# Patient Record
Sex: FEMALE | Race: WHITE | Employment: UNEMPLOYED | URBAN - METROPOLITAN AREA
[De-identification: names, ages, dates, MRNs, and addresses within clinical notes are randomized per-mention and may not be internally consistent; named-entity substitution may affect disease eponyms.]

---

## 2019-02-16 DIAGNOSIS — Z98.890 S/P CEREBRAL ANEURYSM REPAIR: ICD-10-CM

## 2019-02-16 DIAGNOSIS — G43.109 MIGRAINE WITH AURA AND WITHOUT STATUS MIGRAINOSUS, NOT INTRACTABLE: ICD-10-CM

## 2019-02-16 DIAGNOSIS — Z86.79 S/P CEREBRAL ANEURYSM REPAIR: ICD-10-CM

## 2019-02-16 DIAGNOSIS — I67.1 CEREBRAL ANEURYSM WITHOUT RUPTURE: ICD-10-CM

## 2019-02-16 RX ORDER — RIZATRIPTAN BENZOATE 10 MG/1
10 TABLET ORAL
Qty: 12 TAB | Refills: 11 | Status: SHIPPED | OUTPATIENT
Start: 2019-02-16 | End: 2020-03-11 | Stop reason: SDUPTHER

## 2020-03-11 DIAGNOSIS — Z98.890 S/P CEREBRAL ANEURYSM REPAIR: ICD-10-CM

## 2020-03-11 DIAGNOSIS — G43.109 MIGRAINE WITH AURA AND WITHOUT STATUS MIGRAINOSUS, NOT INTRACTABLE: ICD-10-CM

## 2020-03-11 DIAGNOSIS — Z86.79 S/P CEREBRAL ANEURYSM REPAIR: ICD-10-CM

## 2020-03-11 DIAGNOSIS — I67.1 CEREBRAL ANEURYSM WITHOUT RUPTURE: ICD-10-CM

## 2020-03-11 RX ORDER — RIZATRIPTAN BENZOATE 10 MG/1
10 TABLET ORAL
Qty: 12 TAB | Refills: 11 | Status: SHIPPED | OUTPATIENT
Start: 2020-03-11 | End: 2021-03-25 | Stop reason: SDUPTHER

## 2021-03-25 DIAGNOSIS — Z98.890 S/P CEREBRAL ANEURYSM REPAIR: ICD-10-CM

## 2021-03-25 DIAGNOSIS — I67.1 CEREBRAL ANEURYSM WITHOUT RUPTURE: ICD-10-CM

## 2021-03-25 DIAGNOSIS — G43.109 MIGRAINE WITH AURA AND WITHOUT STATUS MIGRAINOSUS, NOT INTRACTABLE: ICD-10-CM

## 2021-03-25 DIAGNOSIS — Z86.79 S/P CEREBRAL ANEURYSM REPAIR: ICD-10-CM

## 2021-03-25 RX ORDER — RIZATRIPTAN BENZOATE 10 MG/1
10 TABLET ORAL
Qty: 12 TAB | Refills: 11 | Status: SHIPPED | OUTPATIENT
Start: 2021-03-25 | End: 2022-03-23 | Stop reason: SDUPTHER

## 2021-04-08 ENCOUNTER — OFFICE VISIT (OUTPATIENT)
Dept: NEUROLOGY | Age: 46
End: 2021-04-08
Payer: COMMERCIAL

## 2021-04-08 VITALS
OXYGEN SATURATION: 100 % | HEIGHT: 64 IN | WEIGHT: 107.8 LBS | DIASTOLIC BLOOD PRESSURE: 82 MMHG | TEMPERATURE: 97.8 F | SYSTOLIC BLOOD PRESSURE: 112 MMHG | RESPIRATION RATE: 16 BRPM | BODY MASS INDEX: 18.4 KG/M2 | HEART RATE: 65 BPM

## 2021-04-08 DIAGNOSIS — G43.109 MIGRAINE WITH AURA AND WITHOUT STATUS MIGRAINOSUS, NOT INTRACTABLE: Primary | ICD-10-CM

## 2021-04-08 DIAGNOSIS — Z86.79: ICD-10-CM

## 2021-04-08 DIAGNOSIS — I67.1 CEREBRAL ANEURYSM WITHOUT RUPTURE: ICD-10-CM

## 2021-04-08 PROCEDURE — 99244 OFF/OP CNSLTJ NEW/EST MOD 40: CPT | Performed by: PSYCHIATRY & NEUROLOGY

## 2021-04-08 NOTE — PROGRESS NOTES
Consult    Subjective:     Mackenzie Islas is a 50 y.o. Right-handed  female seen for new patient evaluation since she has not been seen in over 5 years at the request of Dr. Jair Hagen for evaluation of headaches and evaluation of her cerebral aneurysms. The patient had a history of a ruptured right-sided middle cerebral artery aneurysm that was treated by surgical clipping by Dr. Mic Villasenor in 2004. 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 The patient has been having increasing headaches, associated with nausea but rarely vomiting, and sometimes become incapacitating. She is concerned about her aneurysm, possibly expanding or having sentinel bleeds. 7 years ago she had a spinal tap that was negative for any bleeding. She occasionally gets some visual aura, but has not had any clear focal weakness or sensory loss. She takes Maxalt for headaches which usually helps relieve them, and she gets the headaches about 9 or 10 times a month. She has had no unusual fever, trauma, or other causes like increased stress or tension to account for the headaches. 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 She was seen by interventional neuroradiology over 5 years ago, they recommended repeat study in 5 to 10 years. She is seen for that today.     Past Medical History:   Diagnosis Date    Aneurysm ([address]     Brain aneurysm     Essential hypertension     Headache     Hypertension     Migraine       Past Surgical History:   Procedure Laterality Date    HX GYN      HX INTRACRANIAL ANEURYSM REPAIR  2004     Family History   Problem Relation Age of Onset    Dementia Paternal Grandmother     Cancer Mother         breast    Other Mother         CEA    Cancer Maternal Grandmother     Headache Maternal Grandmother     Stroke Maternal Grandmother     Other Father         GSW      Social History     Tobacco Use    Smoking status: Former Smoker    Smokeless tobacco: Never Used   Substance Use Topics    Alcohol use: Yes     Alcohol/week: 7.0 standard drinks     Types: 7 Glasses of wine per week     Comment: socially         Current Outpatient Medications:     rizatriptan (MAXALT) 10 mg tablet, Take 1 Tab by mouth every eight (8) hours as needed for Migraine. , Disp: 12 Tab, Rfl: 11    citalopram (CELEXA) 40 mg tablet, Take 40 mg by mouth daily. , Disp: , Rfl:     lisinopril (PRINIVIL, ZESTRIL) 2.5 mg tablet, Take 2.5 mg by mouth daily. , Disp: , Rfl:         Allergies   Allergen Reactions    Ibuprofen Hives    Ibuprofen Hives        Review of Systems:  A comprehensive review of systems was negative except for: Neurological: positive for headaches, coordination problems and gait problems   Vitals:    04/08/21 1241   BP: 112/82   Pulse: 65   Resp: 16   Temp: 97.8 °F (36.6 °C)   TempSrc: Temporal   SpO2: 100%   Weight: 107 lb 12.8 oz (48.9 kg)   Height: 5' 3.5\" (1.613 m)     Objective:     I      NEUROLOGICAL EXAM:    Appearance: The patient is well developed, well nourished, provides a coherent history and is in no acute distress. Mental Status: Oriented to time, place and person, and the president, cognitive function is normal and speech is fluent and no aphasia or dysarthria. Mood and affect appropriate. Cranial Nerves:   Intact visual fields. Fundi are benign. ROBI, EOM's full, no nystagmus, no ptosis. Facial sensation is normal. Corneal reflexes are not tested. Facial movement is asymmetric with slightly widened palpebral fissures on the right. Hearing is normal bilaterally. Palate is midline with normal sternocleidomastoid and trapezius muscles are normal. Tongue is midline.   Neck without meningismus or bruits   Motor:  5/5 strength in upper and lower proximal and distal muscles. Normal bulk and tone. No fasciculations. Reflexes:   Deep tendon reflexes 2+/4 and symmetrical.  No babinski or clonus present   Sensory:   Normal to touch, pinprick and vibration. DSS is intact   Gait:  Normal gait. Tremor:   No tremor noted. Cerebellar:  No cerebellar signs present. Neurovascular:  Normal heart sounds and regular rhythm, peripheral pulses intact, and no carotid bruits. Assessment:       ICD-10-CM ICD-9-CM    1. Migraine with aura and without status migrainosus, not intractable  H00.199 496.15 METABOLIC PANEL, COMPREHENSIVE      CBC WITH AUTOMATED DIFF      CTA HEAD NECK W CONT      METABOLIC PANEL, COMPREHENSIVE      CBC WITH AUTOMATED DIFF   2. H/O spontaneous subarachnoid intracranial hemorrhage due to cerebral aneurysm  G27.60 M34.30 METABOLIC PANEL, COMPREHENSIVE      CBC WITH AUTOMATED DIFF      CTA HEAD NECK W CONT      METABOLIC PANEL, COMPREHENSIVE      CBC WITH AUTOMATED DIFF   3. Cerebral aneurysm without rupture  H32.5 498.8 METABOLIC PANEL, COMPREHENSIVE      CBC WITH AUTOMATED DIFF      CTA HEAD NECK W CONT      METABOLIC PANEL, COMPREHENSIVE      CBC WITH AUTOMATED DIFF         Plan:     Patient with known cerebral aneurysm, and previous ruptured aneurysm with increasing headaches and concern for enlarging aneurysm or small bleeds  We will ask her to get a CTA of the head to check the stability of the left middle cerebral artery aneurysm, and see if there is any basilar artery aneurysm or vertebral artery aneurysm present, that she had in 2004  We did routine labs to make sure her kidney function was stable.   We will continue the patient on Maxalt for her headache, and she is to avoid aspirin compounds  She will call if any sudden change in her headaches, and call the results of her tests  Followup to be arranged after this workup or in 1 years time or earlier as need be.  Very worrisome problem that needs to be rechecked and followed closely.   60 minutes spent with patient reviewing her records, going over all of her past history, discussing her CTA needed, and she will go to open unit because of her claustrophobia, and we will see her again in 1 years time assuming the CTA is stable otherwise she may need referral back to neuro interventional radiology to consider stenting or other treatment of her aneurysms since she has multiple and she is a very high risk for recurrent hemorrhage    Signed By: Breezy Serra MD     April 8, 2021

## 2021-04-08 NOTE — PROGRESS NOTES
Chief Complaint   Patient presents with    New Patient     aneurisims in the brain and make sure the clamp is still intact.

## 2021-04-09 LAB
ALBUMIN SERPL-MCNC: 4.5 G/DL (ref 3.8–4.8)
ALBUMIN/GLOB SERPL: 2 {RATIO} (ref 1.2–2.2)
ALP SERPL-CCNC: 50 IU/L (ref 39–117)
ALT SERPL-CCNC: 20 IU/L (ref 0–32)
AST SERPL-CCNC: 22 IU/L (ref 0–40)
BASOPHILS # BLD AUTO: 0 X10E3/UL (ref 0–0.2)
BASOPHILS NFR BLD AUTO: 1 %
BILIRUB SERPL-MCNC: 1.2 MG/DL (ref 0–1.2)
BUN SERPL-MCNC: 10 MG/DL (ref 6–24)
BUN/CREAT SERPL: 14 (ref 9–23)
CALCIUM SERPL-MCNC: 9.5 MG/DL (ref 8.7–10.2)
CHLORIDE SERPL-SCNC: 105 MMOL/L (ref 96–106)
CO2 SERPL-SCNC: 23 MMOL/L (ref 20–29)
CREAT SERPL-MCNC: 0.71 MG/DL (ref 0.57–1)
EOSINOPHIL # BLD AUTO: 0.2 X10E3/UL (ref 0–0.4)
EOSINOPHIL NFR BLD AUTO: 3 %
ERYTHROCYTE [DISTWIDTH] IN BLOOD BY AUTOMATED COUNT: 12.3 % (ref 11.7–15.4)
GLOBULIN SER CALC-MCNC: 2.3 G/DL (ref 1.5–4.5)
GLUCOSE SERPL-MCNC: 84 MG/DL (ref 65–99)
HCT VFR BLD AUTO: 41.6 % (ref 34–46.6)
HGB BLD-MCNC: 14.1 G/DL (ref 11.1–15.9)
IMM GRANULOCYTES # BLD AUTO: 0 X10E3/UL (ref 0–0.1)
IMM GRANULOCYTES NFR BLD AUTO: 0 %
LYMPHOCYTES # BLD AUTO: 2.2 X10E3/UL (ref 0.7–3.1)
LYMPHOCYTES NFR BLD AUTO: 33 %
MCH RBC QN AUTO: 31.1 PG (ref 26.6–33)
MCHC RBC AUTO-ENTMCNC: 33.9 G/DL (ref 31.5–35.7)
MCV RBC AUTO: 92 FL (ref 79–97)
MONOCYTES # BLD AUTO: 0.5 X10E3/UL (ref 0.1–0.9)
MONOCYTES NFR BLD AUTO: 8 %
NEUTROPHILS # BLD AUTO: 3.7 X10E3/UL (ref 1.4–7)
NEUTROPHILS NFR BLD AUTO: 55 %
PLATELET # BLD AUTO: 220 X10E3/UL (ref 150–450)
POTASSIUM SERPL-SCNC: 4.2 MMOL/L (ref 3.5–5.2)
PROT SERPL-MCNC: 6.8 G/DL (ref 6–8.5)
RBC # BLD AUTO: 4.54 X10E6/UL (ref 3.77–5.28)
SODIUM SERPL-SCNC: 143 MMOL/L (ref 134–144)
WBC # BLD AUTO: 6.7 X10E3/UL (ref 3.4–10.8)

## 2021-04-19 ENCOUNTER — HOSPITAL ENCOUNTER (OUTPATIENT)
Dept: CT IMAGING | Age: 46
Discharge: HOME | End: 2021-04-19
Attending: PSYCHIATRY & NEUROLOGY
Payer: COMMERCIAL

## 2021-04-19 ENCOUNTER — DOCUMENTATION ONLY (OUTPATIENT)
Dept: NEUROLOGY | Age: 46
End: 2021-04-19

## 2021-04-19 DIAGNOSIS — I67.1 CEREBRAL ANEURYSM WITHOUT RUPTURE: ICD-10-CM

## 2021-04-19 DIAGNOSIS — Z86.79: ICD-10-CM

## 2021-04-19 DIAGNOSIS — G43.109 MIGRAINE WITH AURA AND WITHOUT STATUS MIGRAINOSUS, NOT INTRACTABLE: ICD-10-CM

## 2021-04-19 PROCEDURE — 70496 CT ANGIOGRAPHY HEAD: CPT

## 2021-04-19 PROCEDURE — 70498 CT ANGIOGRAPHY NECK: CPT

## 2021-04-19 PROCEDURE — 74011000636 HC RX REV CODE- 636: Performed by: RADIOLOGY

## 2021-04-19 RX ADMIN — IOPAMIDOL 100 ML: 755 INJECTION, SOLUTION INTRAVENOUS at 13:21

## 2021-04-19 NOTE — PROGRESS NOTES
I called the patient, told her CTA looks stable, aneurysms all look stable.   We should probably repeat in 2 to 3 years, and lung nodule at 4 mm needs no follow-up according to report but will probably check that at next CTA when we do her head injury 3 years

## 2022-03-19 PROBLEM — Z86.79: Status: ACTIVE | Noted: 2021-04-08

## 2022-03-23 ENCOUNTER — OFFICE VISIT (OUTPATIENT)
Dept: NEUROLOGY | Age: 47
End: 2022-03-23
Payer: COMMERCIAL

## 2022-03-23 VITALS
HEART RATE: 76 BPM | SYSTOLIC BLOOD PRESSURE: 120 MMHG | TEMPERATURE: 97.5 F | OXYGEN SATURATION: 99 % | DIASTOLIC BLOOD PRESSURE: 72 MMHG | RESPIRATION RATE: 18 BRPM | WEIGHT: 115 LBS | BODY MASS INDEX: 20.05 KG/M2

## 2022-03-23 DIAGNOSIS — Z86.79 S/P CEREBRAL ANEURYSM REPAIR: ICD-10-CM

## 2022-03-23 DIAGNOSIS — Z98.890 S/P CEREBRAL ANEURYSM REPAIR: ICD-10-CM

## 2022-03-23 DIAGNOSIS — G43.109 MIGRAINE WITH AURA AND WITHOUT STATUS MIGRAINOSUS, NOT INTRACTABLE: Primary | ICD-10-CM

## 2022-03-23 DIAGNOSIS — I67.1 CEREBRAL ANEURYSM WITHOUT RUPTURE: ICD-10-CM

## 2022-03-23 DIAGNOSIS — Z86.79: ICD-10-CM

## 2022-03-23 PROCEDURE — 99214 OFFICE O/P EST MOD 30 MIN: CPT | Performed by: PSYCHIATRY & NEUROLOGY

## 2022-03-23 RX ORDER — RIZATRIPTAN BENZOATE 10 MG/1
10 TABLET ORAL
Qty: 12 TABLET | Refills: 11 | Status: SHIPPED | OUTPATIENT
Start: 2022-03-23

## 2022-03-23 NOTE — LETTER
3/23/2022    Patient: Hien Morales   YOB: 1975   Date of Visit: 3/23/2022     Paulette Baker PA-C  [address]  [place] [address] [facility] [address]  Via Fax: [phone]    Dear Paulette Baker PA-C,      Thank you for referring Ms. Hien Morales to [facility] for evaluation. My notes for this consultation are attached. Consult    Subjective:     Hien Morales is a 48 y.o. Right-handed  female seen for new patient evaluation since she has not been seen in over 5 years at the request of Dr. Park Love for evaluation of increasing headaches not completely relieved by Maxalt, and having some side effect on Maxalt so we discussed new options like the new CGRP inhibitor Ubrelvy, and she is going to try that so new prescription sent in for her today for that medicine. She was advised of the risk and benefits and side effects, and the fact that it can cause a 1% incidence over placebo for sedation. We will also continue the Maxalt just to have her have a backup in case. Patient is being seen for migraine headaches and evaluation of her cerebral aneurysms. The patient had a history of a ruptured right-sided middle cerebral artery aneurysm that was treated by surgical clipping by Dr. Deep Ruano in 2004. She had a CTA 1 year ago that showed the stable 3 mm M1 segment middle cerebral artery aneurysm and right MCA bifurcation aneurysm that was clipped without residual aneurysmal opacification, and a 2 mm right V3 segment aneurysm, and follow-up was recommended in 2 to 3 years. The patient also had a lung nodule for which no repeat scan was recommended but it was less than 4 mm. We will check that we will recheck her CTA next year. The patient has been having increasing headaches, associated with nausea but rarely vomiting, and sometimes become incapacitating.   8 years ago she had bad headaches and had a spinal tap done to rule out a bleed, that was negative. She occasionally gets some visual aura, but has not had any clear focal weakness or sensory loss. Headaches are commonly unilateral and throbbing in character associated with some nausea and rarely vomiting and has a visual aura. She takes Maxalt for headaches which usually helps relieve them, and she gets the headaches about 9 or 10 times a month. She has had no unusual fever, trauma, or other causes like increased stress or tension to account for the headaches. 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 She was seen by interventional neuroradiology over 5 years ago, they recommended repeat study in 5 to 10 years. Past Medical History:   Diagnosis Date    Aneurysm ([address]     Brain aneurysm     Essential hypertension     Headache     Hypertension     Migraine       Past Surgical History:   Procedure Laterality Date    HX GYN      HX INTRACRANIAL ANEURYSM REPAIR  2004     Family History   Problem Relation Age of Onset    Dementia Paternal Grandmother     Cancer Mother         breast    Other Mother         CEA    Cancer Maternal Grandmother     Headache Maternal Grandmother     Stroke Maternal Grandmother     Other Father         GSW      Social History     Tobacco Use    Smoking status: Former Smoker    Smokeless tobacco: Never Used   Substance Use Topics    Alcohol use: Yes     Alcohol/week: 7.0 standard drinks     Types: 7 Glasses of wine per week     Comment: socially         Current Outpatient Medications:     ubrogepant (Ubrelvy) 50 mg tablet, 1 PO att onset of headache and can repeat in 2 hours if needed, max dose 2 per day, Disp: 16 Tablet, Rfl: 11    rizatriptan (MAXALT) 10 mg tablet, Take 1 Tablet by mouth every eight (8) hours as needed for Migraine. , Disp: 12 Tablet, Rfl: 11    citalopram (CELEXA) 40 mg tablet, Take 40 mg by mouth daily. , Disp: , Rfl:     lisinopril (PRINIVIL, ZESTRIL) 2.5 mg tablet, Take 2.5 mg by mouth daily. , Disp: , Rfl:         Allergies   Allergen Reactions    Ibuprofen Hives    Ibuprofen Hives        Review of Systems:  A comprehensive review of systems was negative except for: Neurological: positive for headaches, coordination problems and gait problems   Vitals:    03/23/22 1404   BP: 120/72   Pulse: 76   Resp: 18   Temp: 97.5 °F (36.4 °C)   SpO2: 99%   Weight: 115 lb (52.2 kg)     Objective:     I      NEUROLOGICAL EXAM:    Appearance: The patient is well developed, well nourished, provides a coherent history and is in no acute distress. Mental Status: Oriented to time, place and person, and the president, cognitive function is normal and speech is fluent and no aphasia or dysarthria. Mood and affect appropriate. Cranial Nerves:   Intact visual fields. Fundi are benign. ROBI, EOM's full, no nystagmus, no ptosis. Facial sensation is normal. Corneal reflexes are not tested. Facial movement is asymmetric with slightly widened palpebral fissures on the right. Hearing is normal bilaterally. Palate is midline with normal sternocleidomastoid and trapezius muscles are normal. Tongue is midline. Neck without meningismus or bruits   Motor:  5/5 strength in upper and lower proximal and distal muscles. Normal bulk and tone. No fasciculations. Reflexes:   Deep tendon reflexes 2+/4 and symmetrical.  No babinski or clonus present   Sensory:   Normal to touch, pinprick and vibration. DSS is intact   Gait:  Normal gait. Tremor:   No tremor noted. Cerebellar:  No cerebellar signs present. Neurovascular:  Normal heart sounds and regular rhythm, peripheral pulses intact, and no carotid bruits. Assessment:       ICD-10-CM ICD-9-CM    1. Migraine with aura and without status migrainosus, not intractable  G43.109 346.00 ubrogepant (Ubrelvy) 50 mg tablet      rizatriptan (MAXALT) 10 mg tablet   2.  H/O spontaneous subarachnoid intracranial hemorrhage due to cerebral aneurysm  Z86.79 V12.59 ubrogepant (Ubrelvy) 50 mg tablet      rizatriptan (MAXALT) 10 mg tablet   3. Cerebral aneurysm without rupture  I67.1 437.3 rizatriptan (MAXALT) 10 mg tablet   4. S/P cerebral aneurysm repair  Z98.890 V45.89 rizatriptan (MAXALT) 10 mg tablet    Z86.79           Plan:     Patient with new problem of headaches that do not respond to Maxalt and having some side effect on Maxalt so we will try her on Ubrelvy the new CGRP inhibitor after the risk and benefits are Splane to the patient in detail. See above. Patient with known cerebral aneurysm, and previous ruptured aneurysm and aneurysm clipping in 2004 on the right middle cerebral artery bifurcation, and repeat CTA last year showing stable clipping reoccurrence of the aneurysm, and a small 3 mm left M1 segment aneurysm and a possible tiny right vertebral aneurysm. We will continue the patient on Maxalt for her headache, and she is to avoid aspirin compounds  She will call if any sudden change in her headaches, and call the results of her tests  Followup to be arranged after this workup or in 1 years time or earlier as need be. Very worrisome problem that needs to be rechecked and followed closely. 32 minutes spent with patient reviewing her records, going over all of her past history, discussing her CTA needed, and we will see her again in 1 years time assuming the CTA is stable otherwise she may need referral back to neuro interventional radiology to consider stenting or other treatment of her aneurysms since she has multiple and she is a very high risk for recurrent hemorrhage  We will also follow her lung lesion with a CTA next year also. She will call me if there is any problem in the meantime. Signed By: Jessica Barboza MD     March 23, 2022           If you have questions, please do not hesitate to call me. I look forward to following your patient along with you.       Sincerely,    Jessica Barboza MD

## 2022-03-24 NOTE — PROGRESS NOTES
Consult    Subjective:     Ketan Villafuerte is a 48 y.o. Right-handed  female seen for new patient evaluation since she has not been seen in over 5 years at the request of Dr. Trevor Rivera for evaluation of increasing headaches not completely relieved by Maxalt, and having some side effect on Maxalt so we discussed new options like the new CGRP inhibitor Ubrelvy, and she is going to try that so new prescription sent in for her today for that medicine. She was advised of the risk and benefits and side effects, and the fact that it can cause a 1% incidence over placebo for sedation. We will also continue the Maxalt just to have her have a backup in case. Patient is being seen for migraine headaches and evaluation of her cerebral aneurysms. The patient had a history of a ruptured right-sided middle cerebral artery aneurysm that was treated by surgical clipping by Dr. Shruthi Dexter in 2004. She had a CTA 1 year ago that showed the stable 3 mm M1 segment middle cerebral artery aneurysm and right MCA bifurcation aneurysm that was clipped without residual aneurysmal opacification, and a 2 mm right V3 segment aneurysm, and follow-up was recommended in 2 to 3 years. The patient also had a lung nodule for which no repeat scan was recommended but it was less than 4 mm. We will check that we will recheck her CTA next year. The patient has been having increasing headaches, associated with nausea but rarely vomiting, and sometimes become incapacitating. 8 years ago she had bad headaches and had a spinal tap done to rule out a bleed, that was negative. She occasionally gets some visual aura, but has not had any clear focal weakness or sensory loss. Headaches are commonly unilateral and throbbing in character associated with some nausea and rarely vomiting and has a visual aura. She takes Maxalt for headaches which usually helps relieve them, and she gets the headaches about 9 or 10 times a month.   She has had no unusual fever, trauma, or other causes like increased stress or tension to account for the headaches. 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 She was seen by interventional neuroradiology over 5 years ago, they recommended repeat study in 5 to 10 years. Past Medical History:   Diagnosis Date    Aneurysm ([address]     Brain aneurysm     Essential hypertension     Headache     Hypertension     Migraine       Past Surgical History:   Procedure Laterality Date    HX GYN      HX INTRACRANIAL ANEURYSM REPAIR  2004     Family History   Problem Relation Age of Onset    Dementia Paternal Grandmother     Cancer Mother         breast    Other Mother         CEA    Cancer Maternal Grandmother     Headache Maternal Grandmother     Stroke Maternal Grandmother     Other Father         GSW      Social History     Tobacco Use    Smoking status: Former Smoker    Smokeless tobacco: Never Used   Substance Use Topics    Alcohol use: Yes     Alcohol/week: 7.0 standard drinks     Types: 7 Glasses of wine per week     Comment: socially         Current Outpatient Medications:     ubrogepant (Ubrelvy) 50 mg tablet, 1 PO att onset of headache and can repeat in 2 hours if needed, max dose 2 per day, Disp: 16 Tablet, Rfl: 11    rizatriptan (MAXALT) 10 mg tablet, Take 1 Tablet by mouth every eight (8) hours as needed for Migraine. , Disp: 12 Tablet, Rfl: 11    citalopram (CELEXA) 40 mg tablet, Take 40 mg by mouth daily. , Disp: , Rfl:     lisinopril (PRINIVIL, ZESTRIL) 2.5 mg tablet, Take 2.5 mg by mouth daily. , Disp: , Rfl:         Allergies   Allergen Reactions    Ibuprofen Hives    Ibuprofen Hives        Review of Systems:  A comprehensive review of systems was negative except for: Neurological: positive for headaches, coordination problems and gait problems   Vitals:    03/23/22 1404   BP: 120/72   Pulse: 76   Resp: 18   Temp: 97.5 °F (36.4 °C)   SpO2: 99%   Weight: 115 lb (52.2 kg)     Objective:     I      NEUROLOGICAL EXAM:    Appearance: The patient is well developed, well nourished, provides a coherent history and is in no acute distress. Mental Status: Oriented to time, place and person, and the president, cognitive function is normal and speech is fluent and no aphasia or dysarthria. Mood and affect appropriate. Cranial Nerves:   Intact visual fields. Fundi are benign. ROBI, EOM's full, no nystagmus, no ptosis. Facial sensation is normal. Corneal reflexes are not tested. Facial movement is asymmetric with slightly widened palpebral fissures on the right. Hearing is normal bilaterally. Palate is midline with normal sternocleidomastoid and trapezius muscles are normal. Tongue is midline. Neck without meningismus or bruits   Motor:  5/5 strength in upper and lower proximal and distal muscles. Normal bulk and tone. No fasciculations. Reflexes:   Deep tendon reflexes 2+/4 and symmetrical.  No babinski or clonus present   Sensory:   Normal to touch, pinprick and vibration. DSS is intact   Gait:  Normal gait. Tremor:   No tremor noted. Cerebellar:  No cerebellar signs present. Neurovascular:  Normal heart sounds and regular rhythm, peripheral pulses intact, and no carotid bruits. Assessment:       ICD-10-CM ICD-9-CM    1. Migraine with aura and without status migrainosus, not intractable  G43.109 346.00 ubrogepant (Ubrelvy) 50 mg tablet      rizatriptan (MAXALT) 10 mg tablet   2. H/O spontaneous subarachnoid intracranial hemorrhage due to cerebral aneurysm  Z86.79 V12.59 ubrogepant Christell Cure) 50 mg tablet      rizatriptan (MAXALT) 10 mg tablet   3. Cerebral aneurysm without rupture  I67.1 437.3 rizatriptan (MAXALT) 10 mg tablet   4.  S/P cerebral aneurysm repair  Z98.890 V45.89 rizatriptan (MAXALT) 10 mg tablet    Z86.79           Plan:     Patient with new problem of headaches that do not respond to Maxalt and having some side effect on Maxalt so we will try her on Ubrelvy the new CGRP inhibitor after the risk and benefits are Splane to the patient in detail. See above. Patient with known cerebral aneurysm, and previous ruptured aneurysm and aneurysm clipping in 2004 on the right middle cerebral artery bifurcation, and repeat CTA last year showing stable clipping reoccurrence of the aneurysm, and a small 3 mm left M1 segment aneurysm and a possible tiny right vertebral aneurysm. We will continue the patient on Maxalt for her headache, and she is to avoid aspirin compounds  She will call if any sudden change in her headaches, and call the results of her tests  Followup to be arranged after this workup or in 1 years time or earlier as need be. Very worrisome problem that needs to be rechecked and followed closely. 32 minutes spent with patient reviewing her records, going over all of her past history, discussing her CTA needed, and we will see her again in 1 years time assuming the CTA is stable otherwise she may need referral back to neuro interventional radiology to consider stenting or other treatment of her aneurysms since she has multiple and she is a very high risk for recurrent hemorrhage  We will also follow her lung lesion with a CTA next year also. She will call me if there is any problem in the meantime.     Signed By: Naty Conner MD     March 23, 2022

## 2023-03-09 ENCOUNTER — TELEPHONE (OUTPATIENT)
Dept: NEUROLOGY | Age: 48
End: 2023-03-09

## 2023-03-09 NOTE — TELEPHONE ENCOUNTER
Pt has appt on 3/13. Wasn't sure if Dr. Chi Parham was planning on ordering new CTs for her. If so, can Dr. Chi Parham put in orders now so she can call [facility] and see if she could get the Cts done after her appt with us?     Please call to let know when CT orders are put in. [phone]

## 2023-03-13 ENCOUNTER — OFFICE VISIT (OUTPATIENT)
Dept: NEUROLOGY | Age: 48
End: 2023-03-13
Payer: COMMERCIAL

## 2023-03-13 VITALS
DIASTOLIC BLOOD PRESSURE: 76 MMHG | HEART RATE: 62 BPM | WEIGHT: 120.4 LBS | OXYGEN SATURATION: 99 % | SYSTOLIC BLOOD PRESSURE: 112 MMHG | HEIGHT: 64 IN | TEMPERATURE: 97.8 F | RESPIRATION RATE: 18 BRPM | BODY MASS INDEX: 20.55 KG/M2

## 2023-03-13 DIAGNOSIS — Z86.79: ICD-10-CM

## 2023-03-13 DIAGNOSIS — Z86.79 S/P CEREBRAL ANEURYSM REPAIR: ICD-10-CM

## 2023-03-13 DIAGNOSIS — Z98.890 S/P CEREBRAL ANEURYSM REPAIR: ICD-10-CM

## 2023-03-13 DIAGNOSIS — I67.1 CEREBRAL ANEURYSM WITHOUT RUPTURE: ICD-10-CM

## 2023-03-13 DIAGNOSIS — G43.109 MIGRAINE WITH AURA AND WITHOUT STATUS MIGRAINOSUS, NOT INTRACTABLE: ICD-10-CM

## 2023-03-13 PROCEDURE — 99214 OFFICE O/P EST MOD 30 MIN: CPT | Performed by: PSYCHIATRY & NEUROLOGY

## 2023-03-13 PROCEDURE — 3074F SYST BP LT 130 MM HG: CPT | Performed by: PSYCHIATRY & NEUROLOGY

## 2023-03-13 PROCEDURE — 3078F DIAST BP <80 MM HG: CPT | Performed by: PSYCHIATRY & NEUROLOGY

## 2023-03-13 RX ORDER — RIZATRIPTAN BENZOATE 10 MG/1
10 TABLET ORAL
Qty: 12 TABLET | Refills: 11 | Status: SHIPPED | OUTPATIENT
Start: 2023-03-13

## 2023-03-13 NOTE — PROGRESS NOTES
Chief Complaint   Patient presents with    Migraine     Annual follow up - getting five a month - rizitriptan is helping - more prevalent when starting menses      1. Have you been to the ER, urgent care clinic since your last visit? Hospitalized since your last visit? No     2. Have you seen or consulted any other health care providers outside of the [address] [place] [address] since your last visit? Include any pap smears or colon screening.   No

## 2023-03-13 NOTE — LETTER
3/13/2023    Patient: Darrell Blank   YOB: 1975   Date of Visit: 3/13/2023     Gagan Campbell PA-C  [address] [place] [id number]  Via Fax: [phone]    Dear Gagan Campbell PA-C,      Thank you for referring Ms. Darrell Blank to [address] [place] [address] for evaluation. My notes for this consultation are attached. Chief Complaint   Patient presents with    Migraine     Annual follow up - getting five a month - rizitriptan is helping - more prevalent when starting menses      1. Have you been to the ER, urgent care clinic since your last visit? Hospitalized since your last visit? No     2. Have you seen or consulted any other health care providers outside of the [address] [place] [address] since your last visit? Include any pap smears or colon screening. No       Consult    Subjective:     Darrell Blank is a 50 y.o. Right-handed  female seen for patient evaluation at the request of Dr. Elie Leggett for evaluation of increasing headaches not completely relieved by Maxalt, and having some side effect on Maxalt so we discussed new options like the new CGRP inhibitor Ubrelvy, and she is going to try that so new prescription sent in for her today for that medicine. She never got her previous prescription approved and never got the medication to try last year. 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 We will also continue the Maxalt just to have her have a backup in case. Patient is being seen for migraine headaches and evaluation of her cerebral aneurysms. The patient also had a lung nodule for which no repeat scan was recommended but it was less than 4 mm. The patient has been having increasing headaches, associated with nausea but rarely vomiting, and sometimes become incapacitating. 8 years ago she had bad headaches and had a spinal tap done to rule out a bleed, that was negative. She occasionally gets some visual aura, but has not had any clear focal weakness or sensory loss. Headaches are commonly unilateral and throbbing in character associated with some nausea and rarely vomiting and has a visual aura. She takes Maxalt for headaches which usually helps relieve them, and she gets the headaches about 9 or 10 times a month. She has had no unusual fever, trauma, or other causes like increased stress or tension to account for the headaches. 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 She was seen by interventional neuroradiology over 7 years ago, they recommended repeat study in 5 to 10 years. Past Medical History:   Diagnosis Date    Aneurysm ([address]     Brain aneurysm     Essential hypertension     Headache     Hypertension     Migraine       Past Surgical History:   Procedure Laterality Date    HX GYN      HX INTRACRANIAL ANEURYSM REPAIR  2004     Family History   Problem Relation Age of Onset    Dementia Paternal Grandmother     Cancer Mother         breast    Other Mother         CEA    Cancer Maternal Grandmother     Headache Maternal Grandmother     Stroke Maternal Grandmother     Other Father         GSW      Social History     Tobacco Use    Smoking status: Former    Smokeless tobacco: Never   Substance Use Topics    Alcohol use:  Yes     Alcohol/week: 7.0 standard drinks     Types: 7 Glasses of wine per week     Comment: socially         Current Outpatient Medications:     rizatriptan (MAXALT) 10 mg tablet, Take 1 Tablet by mouth every eight (8) hours as needed for Migraine. , Disp: 12 Tablet, Rfl: 11    ubrogepant (Ubrelvy) 50 mg tablet, 1 PO att onset of headache and can repeat in 2 hours if needed, max dose 2 per day, Disp: 16 Tablet, Rfl: 11    citalopram (CELEXA) 40 mg tablet, Take 40 mg by mouth daily. , Disp: , Rfl:     lisinopril (PRINIVIL, ZESTRIL) 2.5 mg tablet, Take 2.5 mg by mouth daily. , Disp: , Rfl:         Allergies   Allergen Reactions    Ibuprofen Hives    Ibuprofen Hives        Review of Systems:  A comprehensive review of systems was negative except for: Neurological: positive for headaches, coordination problems and gait problems   Vitals:    03/13/23 1040   BP: 112/76   Pulse: 62   Resp: 18   Temp: 97.8 °F (36.6 °C)   TempSrc: Temporal   SpO2: 99%   Weight: 120 lb 6.4 oz (54.6 kg)   Height: 5' 3.5\" (1.613 m)     Objective:     I      NEUROLOGICAL EXAM:    Appearance: The patient is well developed, well nourished, provides a coherent history and is in no acute distress. Mental Status: Oriented to time, place and person, and the president, cognitive function is normal and speech is fluent and no aphasia or dysarthria. Mood and affect appropriate. Cranial Nerves:   Intact visual fields. Fundi are benign. ROBI, EOM's full, no nystagmus, no ptosis. Facial sensation is normal. Corneal reflexes are not tested. Facial movement is asymmetric with slightly widened palpebral fissures on the right. Hearing is normal bilaterally. Palate is midline with normal sternocleidomastoid and trapezius muscles are normal. Tongue is midline. Neck without meningismus or bruits   Motor:  5/5 strength in upper and lower proximal and distal muscles. Normal bulk and tone. No fasciculations. Reflexes:   Deep tendon reflexes 2+/4 and symmetrical.  No babinski or clonus present   Sensory:   Normal to touch, pinprick and vibration. DSS is intact   Gait:  Normal gait. Tremor:   No tremor noted. Cerebellar:  No cerebellar signs present.    Neurovascular: Normal heart sounds and regular rhythm, peripheral pulses intact, and no carotid bruits. Assessment:       ICD-10-CM ICD-9-CM    1. Migraine with aura and without status migrainosus, not intractable  G43.109 346.00 rizatriptan (MAXALT) 10 mg tablet      ubrogepant (Ubrelvy) 50 mg tablet      CTA HEAD NECK W CONT      2. H/O spontaneous subarachnoid intracranial hemorrhage due to cerebral aneurysm  Z86.79 V12.59 rizatriptan (MAXALT) 10 mg tablet      ubrogepant (Ubrelvy) 50 mg tablet      CTA HEAD NECK W CONT      3. Cerebral aneurysm without rupture  I67.1 437.3 rizatriptan (MAXALT) 10 mg tablet      CTA HEAD NECK W CONT      4. S/P cerebral aneurysm repair  Z98.890 V45.89 rizatriptan (MAXALT) 10 mg tablet    Z86.79  CTA HEAD NECK W CONT            Plan:     Patient seen for new problem of new right V3 segment vertebral artery aneurysm needs repeat CTA to follow that this year to make sure there is no enlargement or the new aneurysms form. Patient has had her right middle cerebral artery bifurcation aneurysm clipped in 2004 that remained stable. She has a stable 3 mm cerebral artery aneurysm also and a stable lung nodule at 4 mm. Patient with new problem of headaches that do not respond to Maxalt and having some side effect on Maxalt so we will try her on Ubrelvy the new CGRP inhibitor after the risk and benefits are Splane to the patient in detail. See above. Patient with known cerebral aneurysm, and previous ruptured aneurysm and aneurysm clipping in 2004 on the right middle cerebral artery bifurcation, and repeat CTA last year showing stable clipping reoccurrence of the aneurysm, and a small 3 mm left M1 segment aneurysm and a possible tiny right vertebral aneurysm.    We will continue the patient on Maxalt for her headache, and she is to avoid aspirin compounds  She will call if any sudden change in her headaches, and call the results of her tests  Followup to be arranged after this workup or in 1 years time or earlier as need be. Very worrisome problem that needs to be rechecked and followed closely. 32 minutes spent with patient reviewing her records, going over all of her past history, discussing her CTA needed, and we will see her again in 1 years time assuming the CTA is stable otherwise she may need referral back to neuro interventional radiology to consider stenting or other treatment of her aneurysms since she has multiple and she is a very high risk for recurrent hemorrhage  We will also follow her lung lesion with a CTA next year also. She will call me if there is any problem in the meantime. Signed By: Cristy Bergman MD     March 13, 2023           If you have questions, please do not hesitate to call me. I look forward to following your patient along with you.       Sincerely,    Cristy Bergman MD

## 2023-03-14 NOTE — PROGRESS NOTES
Consult    Subjective:     Nicole Miller is a 50 y.o. Right-handed  female seen for patient evaluation at the request of Dr. William Mittal for evaluation of increasing headaches not completely relieved by Maxalt, and having some side effect on Maxalt so we discussed new options like the new CGRP inhibitor Ubrelvy, and she is going to try that so new prescription sent in for her today for that medicine. She never got her previous prescription approved and never got the medication to try last year. 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 We will also continue the Maxalt just to have her have a backup in case. Patient is being seen for migraine headaches and evaluation of her cerebral aneurysms. The patient also had a lung nodule for which no repeat scan was recommended but it was less than 4 mm. The patient has been having increasing headaches, associated with nausea but rarely vomiting, and sometimes become incapacitating. 8 years ago she had bad headaches and had a spinal tap done to rule out a bleed, that was negative. She occasionally gets some visual aura, but has not had any clear focal weakness or sensory loss. Headaches are commonly unilateral and throbbing in character associated with some nausea and rarely vomiting and has a visual aura. She takes Maxalt for headaches which usually helps relieve them, and she gets the headaches about 9 or 10 times a month. She has had no unusual fever, trauma, or other causes like increased stress or tension to account for the headaches.  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 She was seen by interventional neuroradiology over 7 years ago, they recommended repeat study in 5 to 10 years. Past Medical History:   Diagnosis Date    Aneurysm ([address]     Brain aneurysm     Essential hypertension     Headache     Hypertension     Migraine       Past Surgical History:   Procedure Laterality Date    HX GYN      HX INTRACRANIAL ANEURYSM REPAIR  2004     Family History   Problem Relation Age of Onset    Dementia Paternal Grandmother     Cancer Mother         breast    Other Mother         CEA    Cancer Maternal Grandmother     Headache Maternal Grandmother     Stroke Maternal Grandmother     Other Father         GSW      Social History     Tobacco Use    Smoking status: Former    Smokeless tobacco: Never   Substance Use Topics    Alcohol use: Yes     Alcohol/week: 7.0 standard drinks     Types: 7 Glasses of wine per week     Comment: socially         Current Outpatient Medications:     rizatriptan (MAXALT) 10 mg tablet, Take 1 Tablet by mouth every eight (8) hours as needed for Migraine. , Disp: 12 Tablet, Rfl: 11    ubrogepant (Ubrelvy) 50 mg tablet, 1 PO att onset of headache and can repeat in 2 hours if needed, max dose 2 per day, Disp: 16 Tablet, Rfl: 11    citalopram (CELEXA) 40 mg tablet, Take 40 mg by mouth daily. , Disp: , Rfl:     lisinopril (PRINIVIL, ZESTRIL) 2.5 mg tablet, Take 2.5 mg by mouth daily. , Disp: , Rfl:         Allergies   Allergen Reactions    Ibuprofen Hives    Ibuprofen Hives        Review of Systems:  A comprehensive review of systems was negative except for: Neurological: positive for headaches, coordination problems and gait problems   Vitals:    03/13/23 1040   BP: 112/76   Pulse: 62   Resp: 18   Temp: 97.8 °F (36.6 °C)   TempSrc: Temporal   SpO2: 99%   Weight: 120 lb 6.4 oz (54.6 kg)   Height: 5' 3.5\" (1.613 m)     Objective:     I      NEUROLOGICAL EXAM:    Appearance:   The patient is well developed, well nourished, provides a coherent history and is in no acute distress. Mental Status: Oriented to time, place and person, and the president, cognitive function is normal and speech is fluent and no aphasia or dysarthria. Mood and affect appropriate. Cranial Nerves:   Intact visual fields. Fundi are benign. ROBI, EOM's full, no nystagmus, no ptosis. Facial sensation is normal. Corneal reflexes are not tested. Facial movement is asymmetric with slightly widened palpebral fissures on the right. Hearing is normal bilaterally. Palate is midline with normal sternocleidomastoid and trapezius muscles are normal. Tongue is midline. Neck without meningismus or bruits   Motor:  5/5 strength in upper and lower proximal and distal muscles. Normal bulk and tone. No fasciculations. Reflexes:   Deep tendon reflexes 2+/4 and symmetrical.  No babinski or clonus present   Sensory:   Normal to touch, pinprick and vibration. DSS is intact   Gait:  Normal gait. Tremor:   No tremor noted. Cerebellar:  No cerebellar signs present. Neurovascular:  Normal heart sounds and regular rhythm, peripheral pulses intact, and no carotid bruits. Assessment:       ICD-10-CM ICD-9-CM    1. Migraine with aura and without status migrainosus, not intractable  G43.109 346.00 rizatriptan (MAXALT) 10 mg tablet      ubrogepant (Ubrelvy) 50 mg tablet      CTA HEAD NECK W CONT      2. H/O spontaneous subarachnoid intracranial hemorrhage due to cerebral aneurysm  Z86.79 V12.59 rizatriptan (MAXALT) 10 mg tablet      ubrogepant (Ubrelvy) 50 mg tablet      CTA HEAD NECK W CONT      3.  Cerebral aneurysm without rupture  I67.1 437.3 rizatriptan (MAXALT) 10 mg tablet      CTA HEAD NECK W CONT      4. S/P cerebral aneurysm repair  Z98.890 V45.89 rizatriptan (MAXALT) 10 mg tablet    Z86.79  CTA HEAD NECK W CONT            Plan:     Patient seen for new problem of new right V3 segment vertebral artery aneurysm needs repeat CTA to follow that this year to make sure there is no enlargement or the new aneurysms form. Patient has had her right middle cerebral artery bifurcation aneurysm clipped in 2004 that remained stable. She has a stable 3 mm cerebral artery aneurysm also and a stable lung nodule at 4 mm. Patient with new problem of headaches that do not respond to Maxalt and having some side effect on Maxalt so we will try her on Ubrelvy the new CGRP inhibitor after the risk and benefits are Splane to the patient in detail. See above. Patient with known cerebral aneurysm, and previous ruptured aneurysm and aneurysm clipping in 2004 on the right middle cerebral artery bifurcation, and repeat CTA last year showing stable clipping reoccurrence of the aneurysm, and a small 3 mm left M1 segment aneurysm and a possible tiny right vertebral aneurysm. We reviewed all her CTAs with the patient in detail and explained the need for repeat CTA and all the side effects and benefits of taking the new medication Jabari Mallet which we also prescribed for her to because of her persistent headaches. We will continue the patient on Maxalt for her headache, and she is to avoid aspirin compounds  She will call if any sudden change in her headaches, and call the results of her tests  Followup to be arranged after this workup or in 1 years time or earlier as need be. Very worrisome problem that needs to be rechecked and followed closely. 32 minutes spent with patient reviewing her records, going over all of her past history, discussing her CTA needed, and we will see her again in 1 years time assuming the CTA is stable otherwise she may need referral back to neuro interventional radiology to consider stenting or other treatment of her aneurysms since she has multiple and she is a very high risk for recurrent hemorrhage  We will also follow her lung lesion with a CTA next year also.   She will call me if there is any problem in the meantime.     Signed By: Nikki Lindsay MD     March 13, 2023

## 2023-03-28 ENCOUNTER — HOSPITAL ENCOUNTER (OUTPATIENT)
Dept: CT IMAGING | Age: 48
Discharge: HOME | End: 2023-03-28
Attending: PSYCHIATRY & NEUROLOGY
Payer: COMMERCIAL

## 2023-03-28 DIAGNOSIS — I67.1 CEREBRAL ANEURYSM WITHOUT RUPTURE: ICD-10-CM

## 2023-03-28 DIAGNOSIS — Z86.79: ICD-10-CM

## 2023-03-28 DIAGNOSIS — Z86.79 S/P CEREBRAL ANEURYSM REPAIR: ICD-10-CM

## 2023-03-28 DIAGNOSIS — G43.109 MIGRAINE WITH AURA AND WITHOUT STATUS MIGRAINOSUS, NOT INTRACTABLE: ICD-10-CM

## 2023-03-28 DIAGNOSIS — Z98.890 S/P CEREBRAL ANEURYSM REPAIR: ICD-10-CM

## 2023-03-28 PROCEDURE — 70498 CT ANGIOGRAPHY NECK: CPT

## 2023-03-28 PROCEDURE — 74011000636 HC RX REV CODE- 636: Performed by: PSYCHIATRY & NEUROLOGY

## 2023-03-28 RX ADMIN — IOPAMIDOL 100 ML: 755 INJECTION, SOLUTION INTRAVENOUS at 11:33

## 2023-04-19 NOTE — LETTER
4/8/2021    Patient: Gonzalez Chavez   YOB: 1975   Date of Visit: 4/8/2021     Gena Delgado PA-C  [address] [facility]  [address] [facility] [address]  Via Fax: [phone]    Dear Gena Delgado PA-C,      Thank you for referring Ms. Gonzalez Chavez to [address] [place] [address] for evaluation. My notes for this consultation are attached. Chief Complaint   Patient presents with    New Patient     aneurisims in the brain and make sure the clamp is still intact. Consult    Subjective:     Gonzalez Chavez is a 50 y.o. Right-handed  female seen for new patient evaluation since she has not been seen in over 5 years at the request of Dr. Michael Toney for evaluation of headaches and evaluation of her cerebral aneurysms. The patient had a history of a ruptured right-sided middle cerebral artery aneurysm that was treated by surgical clipping by Dr. Yaz Gray in 2004. 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 The patient has been having increasing headaches, associated with nausea but rarely vomiting, and sometimes become incapacitating. She is concerned about her aneurysm, possibly expanding or having sentinel bleeds. 7 years ago she had a spinal tap that was negative for any bleeding. She occasionally gets some visual aura, but has not had any clear focal weakness or sensory loss. She takes Maxalt for headaches which usually helps relieve them, and she gets the headaches about 9 or 10 times a month. She has had no unusual fever, trauma, or other causes like increased stress or tension to account for the headaches.  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 She was seen by interventional neuroradiology over 5 years ago, they recommended repeat study in 5 to 10 years. She is seen for that today. Past Medical History:   Diagnosis Date    Aneurysm ([address]     Brain aneurysm     Essential hypertension     Headache     Hypertension     Migraine       Past Surgical History:   Procedure Laterality Date    HX GYN      HX INTRACRANIAL ANEURYSM REPAIR  2004     Family History   Problem Relation Age of Onset    Dementia Paternal Grandmother     Cancer Mother         breast    Other Mother         CEA    Cancer Maternal Grandmother     Headache Maternal Grandmother     Stroke Maternal Grandmother     Other Father         GSW      Social History     Tobacco Use    Smoking status: Former Smoker    Smokeless tobacco: Never Used   Substance Use Topics    Alcohol use: Yes     Alcohol/week: 7.0 standard drinks     Types: 7 Glasses of wine per week     Comment: socially         Current Outpatient Medications:     rizatriptan (MAXALT) 10 mg tablet, Take 1 Tab by mouth every eight (8) hours as needed for Migraine. , Disp: 12 Tab, Rfl: 11    citalopram (CELEXA) 40 mg tablet, Take 40 mg by mouth daily. , Disp: , Rfl:     lisinopril (PRINIVIL, ZESTRIL) 2.5 mg tablet, Take 2.5 mg by mouth daily. , Disp: , Rfl:         Allergies   Allergen Reactions    Ibuprofen Hives    Ibuprofen Hives        Review of Systems:  A comprehensive review of systems was negative except for: Neurological: positive for headaches, coordination problems and gait problems   Vitals:    04/08/21 1241   BP: 112/82   Pulse: 65   Resp: 16   Temp: 97.8 °F (36.6 °C)   TempSrc: Temporal   SpO2: 100%   Weight: 107 lb 12.8 oz (48.9 kg)   Height: 5' 3.5\" (1.613 m)     Objective:     I      NEUROLOGICAL EXAM:    Appearance: The patient is well developed, well nourished, provides a coherent history and is in no acute distress.    Mental Status: Oriented to time, place and person, and the president, cognitive function is normal and speech is fluent and no aphasia or dysarthria. Mood and affect appropriate. Cranial Nerves:   Intact visual fields. Fundi are benign. ROBI, EOM's full, no nystagmus, no ptosis. Facial sensation is normal. Corneal reflexes are not tested. Facial movement is asymmetric with slightly widened palpebral fissures on the right. Hearing is normal bilaterally. Palate is midline with normal sternocleidomastoid and trapezius muscles are normal. Tongue is midline. Neck without meningismus or bruits   Motor:  5/5 strength in upper and lower proximal and distal muscles. Normal bulk and tone. No fasciculations. Reflexes:   Deep tendon reflexes 2+/4 and symmetrical.  No babinski or clonus present   Sensory:   Normal to touch, pinprick and vibration. DSS is intact   Gait:  Normal gait. Tremor:   No tremor noted. Cerebellar:  No cerebellar signs present. Neurovascular:  Normal heart sounds and regular rhythm, peripheral pulses intact, and no carotid bruits. Assessment:       ICD-10-CM ICD-9-CM    1. Migraine with aura and without status migrainosus, not intractable  A07.006 290.54 METABOLIC PANEL, COMPREHENSIVE      CBC WITH AUTOMATED DIFF      CTA HEAD NECK W CONT      METABOLIC PANEL, COMPREHENSIVE      CBC WITH AUTOMATED DIFF   2. H/O spontaneous subarachnoid intracranial hemorrhage due to cerebral aneurysm  H89.92 N19.77 METABOLIC PANEL, COMPREHENSIVE      CBC WITH AUTOMATED DIFF      CTA HEAD NECK W CONT      METABOLIC PANEL, COMPREHENSIVE      CBC WITH AUTOMATED DIFF   3.  Cerebral aneurysm without rupture  Z04.1 757.2 METABOLIC PANEL, COMPREHENSIVE      CBC WITH AUTOMATED DIFF      CTA HEAD NECK W CONT      METABOLIC PANEL, COMPREHENSIVE      CBC WITH AUTOMATED DIFF         Plan:     Patient with known cerebral aneurysm, and previous ruptured aneurysm with increasing headaches and concern for enlarging aneurysm or small bleeds  We will ask her to get a CTA of the head to check the stability of the left middle cerebral artery aneurysm, and see if there is any basilar artery aneurysm or vertebral artery aneurysm present, that she had in 2004  We did routine labs to make sure her kidney function was stable. We will continue the patient on Maxalt for her headache, and she is to avoid aspirin compounds  She will call if any sudden change in her headaches, and call the results of her tests  Followup to be arranged after this workup or in 1 years time or earlier as need be. Very worrisome problem that needs to be rechecked and followed closely. 60 minutes spent with patient reviewing her records, going over all of her past history, discussing her CTA needed, and she will go to open unit because of her claustrophobia, and we will see her again in 1 years time assuming the CTA is stable otherwise she may need referral back to neuro interventional radiology to consider stenting or other treatment of her aneurysms since she has multiple and she is a very high risk for recurrent hemorrhage    Signed By: Delfina Tovar MD     April 8, 2021             If you have questions, please do not hesitate to call me. I look forward to following your patient along with you.       Sincerely,    Delfina Tovar MD Picato Counseling:  I discussed with the patient the risks of Picato including but not limited to erythema, scaling, itching, weeping, crusting, and pain.

## 2024-03-14 ENCOUNTER — OFFICE VISIT (OUTPATIENT)
Age: 49
End: 2024-03-14
Payer: COMMERCIAL

## 2024-03-14 VITALS
BODY MASS INDEX: 20.66 KG/M2 | HEIGHT: 64 IN | OXYGEN SATURATION: 100 % | DIASTOLIC BLOOD PRESSURE: 84 MMHG | HEART RATE: 71 BPM | WEIGHT: 121 LBS | TEMPERATURE: 98 F | SYSTOLIC BLOOD PRESSURE: 136 MMHG | RESPIRATION RATE: 18 BRPM

## 2024-03-14 DIAGNOSIS — G43.109 MIGRAINE WITH AURA AND WITHOUT STATUS MIGRAINOSUS, NOT INTRACTABLE: ICD-10-CM

## 2024-03-14 DIAGNOSIS — Z98.890 S/P CEREBRAL ANEURYSM REPAIR: ICD-10-CM

## 2024-03-14 DIAGNOSIS — Z86.79 S/P CEREBRAL ANEURYSM REPAIR: ICD-10-CM

## 2024-03-14 DIAGNOSIS — M47.812 CERVICAL SPONDYLOSIS WITHOUT MYELOPATHY: ICD-10-CM

## 2024-03-14 DIAGNOSIS — Z86.79: ICD-10-CM

## 2024-03-14 DIAGNOSIS — I67.1 CEREBRAL ANEURYSM WITHOUT RUPTURE: Primary | ICD-10-CM

## 2024-03-14 PROCEDURE — 3075F SYST BP GE 130 - 139MM HG: CPT | Performed by: PSYCHIATRY & NEUROLOGY

## 2024-03-14 PROCEDURE — 3079F DIAST BP 80-89 MM HG: CPT | Performed by: PSYCHIATRY & NEUROLOGY

## 2024-03-14 PROCEDURE — 99213 OFFICE O/P EST LOW 20 MIN: CPT | Performed by: PSYCHIATRY & NEUROLOGY

## 2024-03-14 RX ORDER — RIZATRIPTAN BENZOATE 10 MG/1
10 TABLET ORAL EVERY 12 HOURS PRN
Qty: 36 TABLET | Refills: 3 | Status: SHIPPED | OUTPATIENT
Start: 2024-03-14

## 2024-03-14 RX ORDER — UBROGEPANT 100 MG/1
TABLET ORAL
Qty: 16 TABLET | Refills: 11 | Status: SHIPPED | OUTPATIENT
Start: 2024-03-14

## 2024-03-14 NOTE — PROGRESS NOTES
RM 5    I have reviewed all needed preparation for visit and have obtained necessary documentation. Identified pt with two pt identifiers (name and ). All patient medications has been reviewed.    Chief Complaint   Patient presents with    Migraine     1 yr f/u; pt states migraine has not improved but the medication has been helping; pt statea about 10-15 migraines a month;         Vitals:    24 1444   BP: 136/84   Site: Left Upper Arm   Position: Sitting   Cuff Size: Medium Adult   Pulse: 71   Resp: 18   Temp: 98 °F (36.7 °C)   SpO2: 100%   Weight: 54.9 kg (121 lb)   Height: 1.613 m (5' 3.5\")        Pain Score:   0 - No pain     Coordination of Care Questionnaire:  :   1. Have you been to the ER, urgent care clinic since your last visit?  Hospitalized since your last visit?No    2. Have you seen or consulted any other health care providers outside of the [facility] since your last visit?  Include any pap smears or colon screening. Yes Ob Gyn Dr. Gemma Merida, PCP    An electronic signature was used to authenticate this note.    --RAJI TIPTON MA

## 2024-04-02 ENCOUNTER — TELEPHONE (OUTPATIENT)
Age: 49
End: 2024-04-02

## 2024-05-03 ENCOUNTER — TELEPHONE (OUTPATIENT)
Age: 49
End: 2024-05-03

## 2024-05-03 NOTE — TELEPHONE ENCOUNTER
I was able to complete PA through [facility] to [facility]     Approved!     CaseId:[id number];Status:Approved;Review Type:Prior Auth;Coverage Start Date:05/03/2024;Coverage End Date:05/03/2025;     Sent Rt Fax to [facility]    For future reference if needed, contact [facility] at [phone].       [facility] pharmacy help line [phone]

## 2025-03-17 ENCOUNTER — OFFICE VISIT (OUTPATIENT)
Age: 50
End: 2025-03-17
Payer: COMMERCIAL

## 2025-03-17 VITALS
SYSTOLIC BLOOD PRESSURE: 110 MMHG | HEART RATE: 65 BPM | BODY MASS INDEX: 20.52 KG/M2 | DIASTOLIC BLOOD PRESSURE: 72 MMHG | OXYGEN SATURATION: 100 % | WEIGHT: 115.8 LBS | HEIGHT: 63 IN | RESPIRATION RATE: 15 BRPM | TEMPERATURE: 98 F

## 2025-03-17 DIAGNOSIS — G43.109 MIGRAINE WITH AURA AND WITHOUT STATUS MIGRAINOSUS, NOT INTRACTABLE: ICD-10-CM

## 2025-03-17 DIAGNOSIS — I67.1 CEREBRAL ANEURYSM WITHOUT RUPTURE: ICD-10-CM

## 2025-03-17 PROCEDURE — 3074F SYST BP LT 130 MM HG: CPT | Performed by: PSYCHIATRY & NEUROLOGY

## 2025-03-17 PROCEDURE — 3078F DIAST BP <80 MM HG: CPT | Performed by: PSYCHIATRY & NEUROLOGY

## 2025-03-17 PROCEDURE — 99214 OFFICE O/P EST MOD 30 MIN: CPT | Performed by: PSYCHIATRY & NEUROLOGY

## 2025-03-17 RX ORDER — UBROGEPANT 100 MG/1
TABLET ORAL
Qty: 16 TABLET | Refills: 11 | Status: ACTIVE | OUTPATIENT
Start: 2025-03-17

## 2025-03-17 RX ORDER — DROSPIRENONE 4 MG/1
TABLET, FILM COATED ORAL
COMMUNITY
Start: 2025-02-21

## 2025-03-17 RX ORDER — RIZATRIPTAN BENZOATE 10 MG/1
10 TABLET ORAL EVERY 12 HOURS PRN
Qty: 36 TABLET | Refills: 3 | Status: SHIPPED | OUTPATIENT
Start: 2025-03-17

## 2025-03-17 ASSESSMENT — PATIENT HEALTH QUESTIONNAIRE - PHQ9
SUM OF ALL RESPONSES TO PHQ QUESTIONS 1-9: 0
SUM OF ALL RESPONSES TO PHQ QUESTIONS 1-9: 0
2. FEELING DOWN, DEPRESSED OR HOPELESS: NOT AT ALL
SUM OF ALL RESPONSES TO PHQ QUESTIONS 1-9: 0
1. LITTLE INTEREST OR PLEASURE IN DOING THINGS: NOT AT ALL
SUM OF ALL RESPONSES TO PHQ QUESTIONS 1-9: 0

## 2025-03-18 NOTE — PROGRESS NOTES
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  She is currently getting Ubrelvy 100 mg for her headaches and migraines, that seems to be doing the job but needs her medications refilled.  She cannot get an MRA or MRI because of her aneurysm clip which is not MRI compatible.  She agrees to get another CTA, and if there is any enlargement of the aneurysm she will be for referred back to interventional neuroradiology whom she has seen in the past.      Subjective:     Rossy Crystal is a 49 y.o. right-handed  female seen for patient evaluation at the request of Dr. 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  She is currently getting Ubrelvy 100 mg for her headaches and migraines, that seems to be doing the job but needs her medications refilled.  She cannot get an MRA or MRI because of her aneurysm clip which is not MRI compatible.  She agrees to get another CTA, and if there is any enlargement of the aneurysm she will be for referred back to interventional neuroradiology whom she has seen in the past.  Patient is being seen for migraine headaches and evaluation of her cerebral aneurysms. The patient also had a lung nodule for which no repeat scan was recommended but it was less than 4 mm.  Her medical doctors are taking care of that.  8 years ago she had bad headaches and had a spinal tap done to rule out a bleed, that was negative.  She

## 2025-04-14 ENCOUNTER — TELEPHONE (OUTPATIENT)
Age: 50
End: 2025-04-14

## 2025-04-14 NOTE — TELEPHONE ENCOUNTER
Spoke with patient ID with two identifiers. Informed her that she can have the test done at [facility]. We just need the results after completed. She stated she will call us once she find a facility , so we can send in the referral. Thank you

## 2025-04-14 NOTE — TELEPHONE ENCOUNTER
Patient states that she recently found out that if she was to have the CTA done by a NON [place] facility that it would be less expensive such at [facility]. Pt would like to know if that would be ok. Please call her at [phone]/ pt is schedule at [facility] for tomorrow

## 2025-04-14 NOTE — TELEPHONE ENCOUNTER
Kalpesh with [employer] called to provide the fax number to [facility] for the referral. Fax [phone] office [phone] Auth [id number]

## 2025-04-15 DIAGNOSIS — I67.1 CEREBRAL ANEURYSM WITHOUT RUPTURE: Primary | ICD-10-CM

## 2025-05-23 ENCOUNTER — PATIENT MESSAGE (OUTPATIENT)
Age: 50
End: 2025-05-23

## 2025-06-02 ENCOUNTER — PATIENT MESSAGE (OUTPATIENT)
Age: 50
End: 2025-06-02

## 2025-06-04 ENCOUNTER — CLINICAL DOCUMENTATION (OUTPATIENT)
Age: 50
End: 2025-06-04

## 2025-06-05 NOTE — PROGRESS NOTES
I called the patient, her CT angio came back okay, the 1 clipped aneurysm was seen but no other aneurysms were seen and the clipped 1 was stable, we will see her again in 1 years time or earlier as needed and she is doing fine

## 2025-06-24 ENCOUNTER — TELEPHONE (OUTPATIENT)
Age: 50
End: 2025-06-24

## 2025-06-24 NOTE — TELEPHONE ENCOUNTER
Patient called asking if we could upload her CTA results onto [employer] so she is able to see/review them on her end. Please advise. [phone]

## 2025-07-07 DIAGNOSIS — I67.1 CEREBRAL ANEURYSM WITHOUT RUPTURE: ICD-10-CM
